# Patient Record
Sex: FEMALE | Race: WHITE
[De-identification: names, ages, dates, MRNs, and addresses within clinical notes are randomized per-mention and may not be internally consistent; named-entity substitution may affect disease eponyms.]

---

## 2018-02-07 ENCOUNTER — HOSPITAL ENCOUNTER (OUTPATIENT)
Dept: HOSPITAL 95 - LAB SHORT | Age: 78
Discharge: HOME | End: 2018-02-07
Attending: DERMATOLOGY
Payer: MEDICARE

## 2018-02-07 DIAGNOSIS — D48.5: ICD-10-CM

## 2018-02-07 DIAGNOSIS — D22.71: Primary | ICD-10-CM

## 2018-09-20 ENCOUNTER — HOSPITAL ENCOUNTER (OUTPATIENT)
Dept: HOSPITAL 95 - ORSCSDS | Age: 78
Discharge: HOME | End: 2018-09-20
Payer: MEDICARE

## 2018-09-20 VITALS — HEIGHT: 65 IN | WEIGHT: 168.37 LBS | BODY MASS INDEX: 28.05 KG/M2

## 2018-09-20 DIAGNOSIS — Z79.899: ICD-10-CM

## 2018-09-20 DIAGNOSIS — Z79.82: ICD-10-CM

## 2018-09-20 DIAGNOSIS — G56.01: Primary | ICD-10-CM

## 2018-09-20 DIAGNOSIS — J45.909: ICD-10-CM

## 2018-09-20 PROCEDURE — 01N54ZZ RELEASE MEDIAN NERVE, PERCUTANEOUS ENDOSCOPIC APPROACH: ICD-10-PCS

## 2019-02-13 ENCOUNTER — HOSPITAL ENCOUNTER (OUTPATIENT)
Dept: HOSPITAL 95 - LAB SHORT | Age: 79
Discharge: HOME | End: 2019-02-13
Attending: DERMATOLOGY
Payer: MEDICARE

## 2019-02-13 DIAGNOSIS — C44.619: Primary | ICD-10-CM

## 2020-09-23 NOTE — NUR
09/23/20 0730 Myrna Browne
PT STS ANAPHYLAXIS REACTION TO PCN; SURGEON NOTIFIED & STS OKAY TO
PROCEED WITH ANCEF 2G IVPB.